# Patient Record
Sex: FEMALE | Race: WHITE | Employment: FULL TIME | URBAN - METROPOLITAN AREA
[De-identification: names, ages, dates, MRNs, and addresses within clinical notes are randomized per-mention and may not be internally consistent; named-entity substitution may affect disease eponyms.]

---

## 2021-01-21 ENCOUNTER — IMMUNIZATION (OUTPATIENT)
Dept: PEDIATRICS | Facility: CLINIC | Age: 61
End: 2021-01-21
Payer: COMMERCIAL

## 2021-01-21 PROCEDURE — 0001A PR COVID VAC PFIZER DIL RECON 30 MCG/0.3 ML IM: CPT

## 2021-01-21 PROCEDURE — 91300 PR COVID VAC PFIZER DIL RECON 30 MCG/0.3 ML IM: CPT

## 2021-01-24 ENCOUNTER — HEALTH MAINTENANCE LETTER (OUTPATIENT)
Age: 61
End: 2021-01-24

## 2021-02-11 ENCOUNTER — IMMUNIZATION (OUTPATIENT)
Dept: PEDIATRICS | Facility: CLINIC | Age: 61
End: 2021-02-11
Attending: INTERNAL MEDICINE
Payer: COMMERCIAL

## 2021-02-11 PROCEDURE — 0002A PR COVID VAC PFIZER DIL RECON 30 MCG/0.3 ML IM: CPT

## 2021-02-11 PROCEDURE — 91300 PR COVID VAC PFIZER DIL RECON 30 MCG/0.3 ML IM: CPT

## 2021-07-30 DIAGNOSIS — Z11.59 ENCOUNTER FOR SCREENING FOR OTHER VIRAL DISEASES: ICD-10-CM

## 2021-08-15 ENCOUNTER — LAB (OUTPATIENT)
Dept: URGENT CARE | Facility: URGENT CARE | Age: 61
End: 2021-08-15
Attending: OBSTETRICS & GYNECOLOGY
Payer: COMMERCIAL

## 2021-08-15 DIAGNOSIS — Z11.59 ENCOUNTER FOR SCREENING FOR OTHER VIRAL DISEASES: ICD-10-CM

## 2021-08-15 PROCEDURE — U0005 INFEC AGEN DETEC AMPLI PROBE: HCPCS

## 2021-08-15 PROCEDURE — U0003 INFECTIOUS AGENT DETECTION BY NUCLEIC ACID (DNA OR RNA); SEVERE ACUTE RESPIRATORY SYNDROME CORONAVIRUS 2 (SARS-COV-2) (CORONAVIRUS DISEASE [COVID-19]), AMPLIFIED PROBE TECHNIQUE, MAKING USE OF HIGH THROUGHPUT TECHNOLOGIES AS DESCRIBED BY CMS-2020-01-R: HCPCS

## 2021-08-16 LAB — SARS-COV-2 RNA RESP QL NAA+PROBE: NEGATIVE

## 2021-08-17 RX ORDER — CETIRIZINE HYDROCHLORIDE 10 MG/1
10 TABLET ORAL DAILY
COMMUNITY

## 2021-08-17 RX ORDER — HYDROXYZINE HYDROCHLORIDE 25 MG/1
25 TABLET, FILM COATED ORAL EVERY 6 HOURS PRN
COMMUNITY
End: 2024-02-23

## 2021-08-17 RX ORDER — PETROLATUM,WHITE/LANOLIN
OINTMENT (GRAM) TOPICAL DAILY
COMMUNITY

## 2021-08-17 RX ORDER — ATORVASTATIN CALCIUM 40 MG/1
40 TABLET, FILM COATED ORAL DAILY
COMMUNITY

## 2021-08-17 RX ORDER — MONTELUKAST SODIUM 10 MG/1
10 TABLET ORAL AT BEDTIME
COMMUNITY

## 2021-08-17 RX ORDER — FAMOTIDINE 20 MG/1
20 TABLET, FILM COATED ORAL 2 TIMES DAILY
COMMUNITY

## 2021-08-17 RX ORDER — VITAMIN B COMPLEX
TABLET ORAL DAILY
COMMUNITY

## 2021-08-18 ENCOUNTER — ANESTHESIA (OUTPATIENT)
Dept: SURGERY | Facility: CLINIC | Age: 61
End: 2021-08-18
Payer: COMMERCIAL

## 2021-08-18 ENCOUNTER — ANESTHESIA EVENT (OUTPATIENT)
Dept: SURGERY | Facility: CLINIC | Age: 61
End: 2021-08-18
Payer: COMMERCIAL

## 2021-08-18 ENCOUNTER — HOSPITAL ENCOUNTER (OUTPATIENT)
Facility: CLINIC | Age: 61
Discharge: HOME | End: 2021-08-18
Attending: OBSTETRICS & GYNECOLOGY | Admitting: OBSTETRICS & GYNECOLOGY
Payer: COMMERCIAL

## 2021-08-18 VITALS
DIASTOLIC BLOOD PRESSURE: 76 MMHG | TEMPERATURE: 97.8 F | BODY MASS INDEX: 29.53 KG/M2 | WEIGHT: 173 LBS | SYSTOLIC BLOOD PRESSURE: 123 MMHG | RESPIRATION RATE: 16 BRPM | HEIGHT: 64 IN | OXYGEN SATURATION: 99 % | HEART RATE: 61 BPM

## 2021-08-18 DIAGNOSIS — Z90.722 S/P BSO (STATUS POST BILATERAL SALPINGO-OOPHORECTOMY): Primary | ICD-10-CM

## 2021-08-18 LAB — GLUCOSE BLDC GLUCOMTR-MCNC: 107 MG/DL (ref 70–99)

## 2021-08-18 PROCEDURE — 250N000009 HC RX 250: Performed by: OBSTETRICS & GYNECOLOGY

## 2021-08-18 PROCEDURE — 88305 TISSUE EXAM BY PATHOLOGIST: CPT | Mod: TC | Performed by: OBSTETRICS & GYNECOLOGY

## 2021-08-18 PROCEDURE — 250N000009 HC RX 250: Performed by: SURGERY

## 2021-08-18 PROCEDURE — 370N000017 HC ANESTHESIA TECHNICAL FEE, PER MIN: Performed by: OBSTETRICS & GYNECOLOGY

## 2021-08-18 PROCEDURE — 250N000011 HC RX IP 250 OP 636: Performed by: SURGERY

## 2021-08-18 PROCEDURE — 250N000013 HC RX MED GY IP 250 OP 250 PS 637: Performed by: OBSTETRICS & GYNECOLOGY

## 2021-08-18 PROCEDURE — 999N000141 HC STATISTIC PRE-PROCEDURE NURSING ASSESSMENT: Performed by: OBSTETRICS & GYNECOLOGY

## 2021-08-18 PROCEDURE — 258N000003 HC RX IP 258 OP 636: Performed by: SURGERY

## 2021-08-18 PROCEDURE — 258N000001 HC RX 258: Performed by: OBSTETRICS & GYNECOLOGY

## 2021-08-18 PROCEDURE — 272N000001 HC OR GENERAL SUPPLY STERILE: Performed by: OBSTETRICS & GYNECOLOGY

## 2021-08-18 PROCEDURE — 250N000025 HC SEVOFLURANE, PER MIN: Performed by: OBSTETRICS & GYNECOLOGY

## 2021-08-18 PROCEDURE — 710N000012 HC RECOVERY PHASE 2, PER MINUTE: Performed by: OBSTETRICS & GYNECOLOGY

## 2021-08-18 PROCEDURE — 710N000009 HC RECOVERY PHASE 1, LEVEL 1, PER MIN: Performed by: OBSTETRICS & GYNECOLOGY

## 2021-08-18 PROCEDURE — 360N000076 HC SURGERY LEVEL 3, PER MIN: Performed by: OBSTETRICS & GYNECOLOGY

## 2021-08-18 PROCEDURE — 250N000011 HC RX IP 250 OP 636: Performed by: OBSTETRICS & GYNECOLOGY

## 2021-08-18 RX ORDER — VASOPRESSIN 20 U/ML
INJECTION PARENTERAL
Status: DISCONTINUED
Start: 2021-08-18 | End: 2021-08-18 | Stop reason: HOSPADM

## 2021-08-18 RX ORDER — FENTANYL CITRATE 0.05 MG/ML
50 INJECTION, SOLUTION INTRAMUSCULAR; INTRAVENOUS
Status: DISCONTINUED | OUTPATIENT
Start: 2021-08-18 | End: 2021-08-18 | Stop reason: HOSPADM

## 2021-08-18 RX ORDER — SODIUM CHLORIDE, SODIUM LACTATE, POTASSIUM CHLORIDE, CALCIUM CHLORIDE 600; 310; 30; 20 MG/100ML; MG/100ML; MG/100ML; MG/100ML
INJECTION, SOLUTION INTRAVENOUS CONTINUOUS PRN
Status: DISCONTINUED | OUTPATIENT
Start: 2021-08-18 | End: 2021-08-18

## 2021-08-18 RX ORDER — ONDANSETRON 2 MG/ML
INJECTION INTRAMUSCULAR; INTRAVENOUS PRN
Status: DISCONTINUED | OUTPATIENT
Start: 2021-08-18 | End: 2021-08-18

## 2021-08-18 RX ORDER — KETOROLAC TROMETHAMINE 15 MG/ML
15 INJECTION, SOLUTION INTRAMUSCULAR; INTRAVENOUS EVERY 6 HOURS PRN
Status: DISCONTINUED | OUTPATIENT
Start: 2021-08-18 | End: 2021-08-18 | Stop reason: HOSPADM

## 2021-08-18 RX ORDER — HYDRALAZINE HYDROCHLORIDE 20 MG/ML
2.5-5 INJECTION INTRAMUSCULAR; INTRAVENOUS EVERY 10 MIN PRN
Status: DISCONTINUED | OUTPATIENT
Start: 2021-08-18 | End: 2021-08-18 | Stop reason: HOSPADM

## 2021-08-18 RX ORDER — BUPIVACAINE HYDROCHLORIDE 2.5 MG/ML
INJECTION, SOLUTION EPIDURAL; INFILTRATION; INTRACAUDAL
Status: DISCONTINUED
Start: 2021-08-18 | End: 2021-08-18 | Stop reason: HOSPADM

## 2021-08-18 RX ORDER — BUPIVACAINE HYDROCHLORIDE 2.5 MG/ML
INJECTION, SOLUTION INFILTRATION; PERINEURAL PRN
Status: DISCONTINUED | OUTPATIENT
Start: 2021-08-18 | End: 2021-08-18 | Stop reason: HOSPADM

## 2021-08-18 RX ORDER — NEOSTIGMINE METHYLSULFATE 1 MG/ML
VIAL (ML) INJECTION PRN
Status: DISCONTINUED | OUTPATIENT
Start: 2021-08-18 | End: 2021-08-18

## 2021-08-18 RX ORDER — ACETAMINOPHEN 325 MG/1
975 TABLET ORAL ONCE
Status: DISCONTINUED | OUTPATIENT
Start: 2021-08-18 | End: 2021-08-18 | Stop reason: HOSPADM

## 2021-08-18 RX ORDER — OXYCODONE HYDROCHLORIDE 5 MG/1
5 TABLET ORAL
Status: COMPLETED | OUTPATIENT
Start: 2021-08-18 | End: 2021-08-18

## 2021-08-18 RX ORDER — IBUPROFEN 200 MG
600 TABLET ORAL EVERY 6 HOURS PRN
COMMUNITY
Start: 2021-08-18

## 2021-08-18 RX ORDER — LIDOCAINE HYDROCHLORIDE 20 MG/ML
INJECTION, SOLUTION INFILTRATION; PERINEURAL PRN
Status: DISCONTINUED | OUTPATIENT
Start: 2021-08-18 | End: 2021-08-18

## 2021-08-18 RX ORDER — PROPOFOL 10 MG/ML
INJECTION, EMULSION INTRAVENOUS CONTINUOUS PRN
Status: DISCONTINUED | OUTPATIENT
Start: 2021-08-18 | End: 2021-08-18

## 2021-08-18 RX ORDER — PROPOFOL 10 MG/ML
INJECTION, EMULSION INTRAVENOUS PRN
Status: DISCONTINUED | OUTPATIENT
Start: 2021-08-18 | End: 2021-08-18

## 2021-08-18 RX ORDER — GLYCOPYRROLATE 0.2 MG/ML
INJECTION, SOLUTION INTRAMUSCULAR; INTRAVENOUS PRN
Status: DISCONTINUED | OUTPATIENT
Start: 2021-08-18 | End: 2021-08-18

## 2021-08-18 RX ORDER — KETOROLAC TROMETHAMINE 30 MG/ML
30 INJECTION, SOLUTION INTRAMUSCULAR; INTRAVENOUS ONCE
Status: DISCONTINUED | OUTPATIENT
Start: 2021-08-18 | End: 2021-08-18 | Stop reason: HOSPADM

## 2021-08-18 RX ORDER — OXYCODONE HYDROCHLORIDE 5 MG/1
5-10 TABLET ORAL EVERY 4 HOURS PRN
Qty: 12 TABLET | Refills: 0 | Status: SHIPPED | OUTPATIENT
Start: 2021-08-18 | End: 2021-08-21

## 2021-08-18 RX ORDER — DEXAMETHASONE SODIUM PHOSPHATE 4 MG/ML
INJECTION, SOLUTION INTRA-ARTICULAR; INTRALESIONAL; INTRAMUSCULAR; INTRAVENOUS; SOFT TISSUE PRN
Status: DISCONTINUED | OUTPATIENT
Start: 2021-08-18 | End: 2021-08-18

## 2021-08-18 RX ORDER — FENTANYL CITRATE 0.05 MG/ML
25 INJECTION, SOLUTION INTRAMUSCULAR; INTRAVENOUS EVERY 5 MIN PRN
Status: DISCONTINUED | OUTPATIENT
Start: 2021-08-18 | End: 2021-08-18 | Stop reason: HOSPADM

## 2021-08-18 RX ORDER — KETOROLAC TROMETHAMINE 30 MG/ML
INJECTION, SOLUTION INTRAMUSCULAR; INTRAVENOUS PRN
Status: DISCONTINUED | OUTPATIENT
Start: 2021-08-18 | End: 2021-08-18

## 2021-08-18 RX ORDER — ONDANSETRON 4 MG/1
4 TABLET, ORALLY DISINTEGRATING ORAL EVERY 30 MIN PRN
Status: DISCONTINUED | OUTPATIENT
Start: 2021-08-18 | End: 2021-08-18 | Stop reason: HOSPADM

## 2021-08-18 RX ORDER — ACETAMINOPHEN 325 MG/1
975 TABLET ORAL ONCE
Status: COMPLETED | OUTPATIENT
Start: 2021-08-18 | End: 2021-08-18

## 2021-08-18 RX ORDER — IBUPROFEN 400 MG/1
800 TABLET, FILM COATED ORAL ONCE
Status: DISCONTINUED | OUTPATIENT
Start: 2021-08-18 | End: 2021-08-18 | Stop reason: HOSPADM

## 2021-08-18 RX ORDER — SODIUM CHLORIDE, SODIUM LACTATE, POTASSIUM CHLORIDE, CALCIUM CHLORIDE 600; 310; 30; 20 MG/100ML; MG/100ML; MG/100ML; MG/100ML
INJECTION, SOLUTION INTRAVENOUS CONTINUOUS
Status: DISCONTINUED | OUTPATIENT
Start: 2021-08-18 | End: 2021-08-18 | Stop reason: HOSPADM

## 2021-08-18 RX ORDER — MEPERIDINE HYDROCHLORIDE 25 MG/ML
12.5 INJECTION INTRAMUSCULAR; INTRAVENOUS; SUBCUTANEOUS
Status: DISCONTINUED | OUTPATIENT
Start: 2021-08-18 | End: 2021-08-18 | Stop reason: HOSPADM

## 2021-08-18 RX ORDER — HYDROMORPHONE HCL IN WATER/PF 6 MG/30 ML
0.2 PATIENT CONTROLLED ANALGESIA SYRINGE INTRAVENOUS EVERY 5 MIN PRN
Status: DISCONTINUED | OUTPATIENT
Start: 2021-08-18 | End: 2021-08-18 | Stop reason: HOSPADM

## 2021-08-18 RX ORDER — ONDANSETRON 2 MG/ML
4 INJECTION INTRAMUSCULAR; INTRAVENOUS EVERY 30 MIN PRN
Status: DISCONTINUED | OUTPATIENT
Start: 2021-08-18 | End: 2021-08-18 | Stop reason: HOSPADM

## 2021-08-18 RX ORDER — FENTANYL CITRATE 50 UG/ML
INJECTION, SOLUTION INTRAMUSCULAR; INTRAVENOUS PRN
Status: DISCONTINUED | OUTPATIENT
Start: 2021-08-18 | End: 2021-08-18

## 2021-08-18 RX ORDER — ACETAMINOPHEN 325 MG/1
975 TABLET ORAL EVERY 6 HOURS PRN
Qty: 50 TABLET | Refills: 0 | COMMUNITY
Start: 2021-08-18

## 2021-08-18 RX ORDER — OXYCODONE HYDROCHLORIDE 5 MG/1
5 TABLET ORAL EVERY 4 HOURS PRN
Status: DISCONTINUED | OUTPATIENT
Start: 2021-08-18 | End: 2021-08-18 | Stop reason: HOSPADM

## 2021-08-18 RX ORDER — MAGNESIUM HYDROXIDE 1200 MG/15ML
LIQUID ORAL PRN
Status: DISCONTINUED | OUTPATIENT
Start: 2021-08-18 | End: 2021-08-18 | Stop reason: HOSPADM

## 2021-08-18 RX ORDER — SODIUM CHLORIDE, SODIUM LACTATE, POTASSIUM CHLORIDE, CALCIUM CHLORIDE 600; 310; 30; 20 MG/100ML; MG/100ML; MG/100ML; MG/100ML
INJECTION, SOLUTION INTRAVENOUS ONCE
Status: COMPLETED | OUTPATIENT
Start: 2021-08-18 | End: 2021-08-18

## 2021-08-18 RX ADMIN — FENTANYL CITRATE 25 MCG: 50 INJECTION, SOLUTION INTRAMUSCULAR; INTRAVENOUS at 13:23

## 2021-08-18 RX ADMIN — OXYCODONE HYDROCHLORIDE 5 MG: 5 TABLET ORAL at 13:55

## 2021-08-18 RX ADMIN — PROPOFOL 200 MG: 10 INJECTION, EMULSION INTRAVENOUS at 11:16

## 2021-08-18 RX ADMIN — DEXAMETHASONE SODIUM PHOSPHATE 4 MG: 4 INJECTION, SOLUTION INTRA-ARTICULAR; INTRALESIONAL; INTRAMUSCULAR; INTRAVENOUS; SOFT TISSUE at 11:28

## 2021-08-18 RX ADMIN — SODIUM CHLORIDE, POTASSIUM CHLORIDE, SODIUM LACTATE AND CALCIUM CHLORIDE: 600; 310; 30; 20 INJECTION, SOLUTION INTRAVENOUS at 11:12

## 2021-08-18 RX ADMIN — FENTANYL CITRATE 25 MCG: 50 INJECTION, SOLUTION INTRAMUSCULAR; INTRAVENOUS at 13:28

## 2021-08-18 RX ADMIN — LIDOCAINE HYDROCHLORIDE 80 MG: 20 INJECTION, SOLUTION INFILTRATION; PERINEURAL at 11:16

## 2021-08-18 RX ADMIN — SODIUM CHLORIDE, POTASSIUM CHLORIDE, SODIUM LACTATE AND CALCIUM CHLORIDE: 600; 310; 30; 20 INJECTION, SOLUTION INTRAVENOUS at 14:19

## 2021-08-18 RX ADMIN — FENTANYL CITRATE 100 MCG: 50 INJECTION, SOLUTION INTRAMUSCULAR; INTRAVENOUS at 11:16

## 2021-08-18 RX ADMIN — ONDANSETRON 4 MG: 2 INJECTION INTRAMUSCULAR; INTRAVENOUS at 12:24

## 2021-08-18 RX ADMIN — ROCURONIUM BROMIDE 50 MG: 10 INJECTION INTRAVENOUS at 11:16

## 2021-08-18 RX ADMIN — HYDROMORPHONE HYDROCHLORIDE 0.2 MG: 0.2 INJECTION, SOLUTION INTRAMUSCULAR; INTRAVENOUS; SUBCUTANEOUS at 13:36

## 2021-08-18 RX ADMIN — MIDAZOLAM 2 MG: 1 INJECTION INTRAMUSCULAR; INTRAVENOUS at 11:12

## 2021-08-18 RX ADMIN — NEOSTIGMINE METHYLSULFATE 4 MG: 1 INJECTION, SOLUTION INTRAVENOUS at 12:32

## 2021-08-18 RX ADMIN — PROPOFOL 30 MCG/KG/MIN: 10 INJECTION, EMULSION INTRAVENOUS at 11:19

## 2021-08-18 RX ADMIN — HYDROMORPHONE HYDROCHLORIDE 0.5 MG: 1 INJECTION, SOLUTION INTRAMUSCULAR; INTRAVENOUS; SUBCUTANEOUS at 11:41

## 2021-08-18 RX ADMIN — DEXMEDETOMIDINE 12 MCG: 100 INJECTION, SOLUTION, CONCENTRATE INTRAVENOUS at 11:55

## 2021-08-18 RX ADMIN — ACETAMINOPHEN 975 MG: 325 TABLET, FILM COATED ORAL at 09:19

## 2021-08-18 RX ADMIN — KETOROLAC TROMETHAMINE 30 MG: 30 INJECTION, SOLUTION INTRAMUSCULAR at 12:27

## 2021-08-18 RX ADMIN — GLYCOPYRROLATE 0.6 MG: 0.2 INJECTION, SOLUTION INTRAMUSCULAR; INTRAVENOUS at 12:32

## 2021-08-18 ASSESSMENT — MIFFLIN-ST. JEOR: SCORE: 1336.78

## 2021-08-18 ASSESSMENT — ENCOUNTER SYMPTOMS: DYSRHYTHMIAS: 0

## 2021-08-18 NOTE — OP NOTE
[facility]  Gynecology Brief Operative Note    Pre-operative diagnosis: Cysts of both ovaries [N83.201, N83.202]   Post-operative diagnosis Right ovarian cyst   Procedure: Procedure(s):  LAPAROSCOPIC BILATERAL SALPINGO-OOPHORECTOMY   Surgeon: Jennifer L. Schwab, MD   Assistants(s): Esme Chen MD   Anesthesia: General    Estimated blood loss: 25cc    Total IV fluids: (See anesthesia record)   Blood transfusion: No transfusion was given during surgery   Total urine output: (See anesthesia record)  100ml   Drains: None   Specimens: Right fallopian tube and ovary, left fallopian tube and ovary   Implants: None   Findings: 6cm right ovarian cyst, otherwise normal appearing anatomy   Complications: None   Condition: Stable   Comments: See dictated operative report for full details    Jennifer L. Schwab, MD

## 2021-08-18 NOTE — ANESTHESIA PROCEDURE NOTES
Airway       Patient location during procedure: OR       Procedure Start/Stop Times: 8/18/2021 11:18 AM  Staff -        Anesthesiologist:  Josse Chris MD       CRNA: Holli Frausto APRN CRNA       Other Anesthesia Staff: Steve Patel       Performed By: SCOTT  Consent for Airway        Urgency: elective  Indications and Patient Condition       Indications for airway management: eunice-procedural       Induction type:intravenous       Mask difficulty assessment: 2 - vent by mask + OA or adjuvant +/- NMBA    Final Airway Details       Final airway type: endotracheal airway       Successful airway: ETT - single and Oral  Endotracheal Airway Details        ETT size (mm): 7.0       Cuffed: yes       Successful intubation technique: direct laryngoscopy       DL Blade Type: MAC 3       Grade View of Cords: 1       Adjucts: stylet       Position: Right       Secured at (cm): 22       Bite block used: None    Post intubation assessment        Placement verified by: capnometry, equal breath sounds and chest rise        Number of attempts at approach: 1       Secured with: pink tape       Ease of procedure: easy       Dentition: Intact and Unchanged

## 2021-08-18 NOTE — ANESTHESIA CARE TRANSFER NOTE
Patient: Nadja Matias    Procedure(s):  LAPAROSCOPIC BILATERAL SALPINGO-OOPHORECTOMY    Diagnosis: Cysts of both ovaries [N83.201, N83.202]  Diagnosis Additional Information: No value filed.    Anesthesia Type:   General     Note:      Level of Consciousness: awake  Oxygen Supplementation: face mask    Independent Airway: airway patency satisfactory and stable        Patient transferred to: PACU  Comments: Neuromuscular blockade reversed after TOF 4/4, spontaneous respirations, adequate tidal volumes, followed commands to voice, oropharynx suctioned with soft flexible catheter, extubated atraumatically, extubated with suction, airway patent after extubation.  Oxygen via facemask at 8 liters per minute to PACU. Oxygen tubing connected to wall O2 in PACU, SpO2, NiBP, and EKG monitors and alarms on and functioning, Sandy Hugger warmer connected to patient gown, report on patient's clinical status given to PACU RN, RN questions answered.     Handoff Report: Identifed the Patient, Identified the Reponsible Provider, Reviewed the pertinent medical history, Discussed the surgical course, Reviewed Intra-OP anesthesia mangement and issues during anesthesia, Set expectations for post-procedure period and Allowed opportunity for questions and acknowledgement of understanding      Electronically Signed By: RUBI Lombardi CRNA  August 18, 2021  12:46 PM

## 2021-08-18 NOTE — ANESTHESIA PREPROCEDURE EVALUATION
Anesthesia Pre-Procedure Evaluation    Patient: Nadja Matias   MRN: [id number] : 1960        Preoperative Diagnosis: Cysts of both ovaries [N83.201, N83.202]   Procedure : Procedure(s):  LAPAROSCOPIC BILATERAL SALPINGO-OOPHORECTOMY     Past Medical History:   Diagnosis Date     Diabetes (H)      Gastroesophageal reflux disease      Hypertension      Migraine      Uncomplicated asthma       Past Surgical History:   Procedure Laterality Date      SECTION       COLONOSCOPY       DILATION AND CURETTAGE       ORTHOPEDIC SURGERY Right 2012    hamstring repair     WRIST SURGERY        Allergies   Allergen Reactions     Amoxicillin      Latex      Nickel      Sulfa Drugs       Social History     Tobacco Use     Smoking status: Never Smoker     Smokeless tobacco: Never Used   Substance Use Topics     Alcohol use: Yes     Comment: social      Wt Readings from Last 1 Encounters:   21 78.5 kg (173 lb)        Anesthesia Evaluation            ROS/MED HX  ENT/Pulmonary:     (+) sleep apnea, uses CPAP, allergic rhinitis, asthma     Neurologic:     (+) migraines,     Cardiovascular:     (+) hypertension----- (-) CHF and arrhythmias   METS/Exercise Tolerance:     Hematologic:       Musculoskeletal:       GI/Hepatic:     (+) GERD,     Renal/Genitourinary:       Endo:     (+) type II DM, Not using insulin,     Psychiatric/Substance Use:       Infectious Disease:       Malignancy:       Other:            Physical Exam    Airway        Mallampati: I   TM distance: > 3 FB   Neck ROM: full   Mouth opening: > 3 cm    Respiratory Devices and Support         Dental  no notable dental history         Cardiovascular   cardiovascular exam normal          Pulmonary   pulmonary exam normal                OUTSIDE LABS:  CBC:   Lab Results   Component Value Date    WBC 9.5 2006    HGB 14.6 2006    HCT 43.1 2006     2006     BMP:   Lab Results   Component Value Date     (H) 2021      COAGS: No results found for: PTT, INR, FIBR  POC: No results found for: BGM, HCG, HCGS  HEPATIC: No results found for: ALBUMIN, PROTTOTAL, ALT, AST, GGT, ALKPHOS, BILITOTAL, BILIDIRECT, EMILEE  OTHER:   Lab Results   Component Value Date    TSH 1.11 06/16/2006       Anesthesia Plan    ASA Status:  2   NPO Status:  NPO Appropriate    Anesthesia Type: General.     - Airway: ETT   Induction: Intravenous, Propofol.   Maintenance: Balanced.        Consents    Anesthesia Plan(s) and associated risks, benefits, and realistic alternatives discussed. Questions answered and patient/representative(s) expressed understanding.     - Discussed with:  Patient         Postoperative Care    Pain management: IV analgesics, Multi-modal analgesia.   PONV prophylaxis: Ondansetron (or other 5HT-3), Dexamethasone or Solumedrol, Background Propofol Infusion     Comments:                Josse Chris MD

## 2021-08-18 NOTE — ANESTHESIA POSTPROCEDURE EVALUATION
Patient: Nadja Matias    Procedure(s):  LAPAROSCOPIC BILATERAL SALPINGO-OOPHORECTOMY    Diagnosis:Cysts of both ovaries [N83.201, N83.202]  Diagnosis Additional Information: No value filed.    Anesthesia Type:  General    Note:  Disposition: Outpatient   Postop Pain Control: Uneventful            Sign Out: Well controlled pain   PONV: No   Neuro/Psych: Uneventful            Sign Out: Acceptable/Baseline neuro status   Airway/Respiratory: Uneventful            Sign Out: Acceptable/Baseline resp. status   CV/Hemodynamics: Uneventful            Sign Out: Acceptable CV status   Other NRE: NONE   DID A NON-ROUTINE EVENT OCCUR? No           Last vitals:  Vitals Value Taken Time   /78 08/18/21 1400   Temp 36.6  C (97.8  F) 08/18/21 1400   Pulse 64 08/18/21 1408   Resp 14 08/18/21 1408   SpO2 100 % 08/18/21 1408   Vitals shown include unvalidated device data.    Electronically Signed By: Josse Chris MD  August 18, 2021  3:06 PM

## 2021-08-18 NOTE — OP NOTE
Procedure Date: 08/18/2021    PREOPERATIVE DIAGNOSIS:  Right ovarian cyst.    POSTOPERATIVE DIAGNOSIS:  Right ovarian cyst.    PROCEDURE PERFORMED:  Laparoscopic bilateral salpingo-oophorectomy.    SURGEON:  Jennifer Schwab, MD    ASSISTANT:  Esme Chen MD    ANESTHESIA:  General.    ESTIMATED BLOOD LOSS:  25 mL.    TOTAL URINE OUTPUT:  100 mL.    SPECIMENS:  Right fallopian tube and ovary and left fallopian tube and ovary.    FINDINGS:  A 6 cm right ovarian cyst, otherwise normal appearing anatomy.    COMPLICATIONS:  None.    DESCRIPTION OF PROCEDURE:  The patient was counseled and consented and taken to the operating room, where general anesthesia was administered.  She was placed in the dorsal lithotomy position using Yellofin stirrups and prepped and draped in the usual sterile fashion.  After a time-out was performed, bivalve speculum was inserted and a single tooth tenaculum used to grasp the anterior lip of the cervix.  An acorn uterine manipulator was placed and the speculum was removed.  A Dash catheter was inserted.  Attention was turned to the patient's abdomen.  An incision was made above the umbilicus with a #11 scalpel and Veress needle was inserted.  Proper placement was confirmed and pneumoperitoneum was obtained.  A 5 mm trocar was placed and there was good visualization with the laparoscope.  A 5 mm trocar was placed in the right lower quadrant under direct visualization and an 11 mm trocar was placed in the left lower quadrant.     Attention was first turned to the right adnexa with a 6 cm right ovarian cyst. It was smooth and appeared fluid-filled.  First, the right IP ligament was ligated with use of the LigaSure.  The right uteroovarian and fallopian tube were then ligated and once these dissections were connected in the middle, the right ovary with cyst and fallopian tube were completely ligated with no rupture of the ovarian cyst.  A left salpingo-oophorectomy was then carried out in a  similar fashion.  The left and right adnexa were removed from the abdomen separately.  An EndoCatch bag was placed through the 11 mm port.  First, the left ovary and fallopian tube were removed and sent for pathology, followed by the right fallopian tube with ovary.  The right ovarian cyst was ruptured within the EndoCatch bag and there was some spillage onto the surgical field externally, but there was no spillage within the peritoneal cavity; this was brown serous fluid.  The right ovary with cyst was then removed in pieces until the EndoCatch bag was removed.  Irrigation was performed and hemostasis was observed.  At this point, the procedure was complete.  The left lower quadrant 11 mm site fascia was closed with a Carroll-Eileen and 0 Vicryl.  Pneumoperitoneum was released, trocars were removed and skin was closed with 4-0 Monocryl in a subcuticular fashion.  Incisions were covered with Steri-Strips and Band-Aids.  Dash catheter was removed.  Uterine manipulator was removed and on visualization of the cervix, hemostasis was observed.  All counts were correct.  The patient was extubated.  She tolerated the procedure well and was transferred to recovery in stable condition.      Jennifer Schwab, MD        D: 2021   T: 2021   MT: [id number]    Name:     JUSTINA SHIRLEY  MRN:      [id number]        Account:        [id number]   :      1960           Procedure Date: 2021     Document: [id number]

## 2021-08-18 NOTE — DISCHARGE INSTRUCTIONS
Today you received Toradol, an antiinflammatory medication similar to Ibuprofen.  You should not take other antiinflammatory medication, such as Ibuprofen, Motrin, Advil, Aleve, Naprosyn, etc until 6:30 pm today.     Today you were given 975 mg of Tylenol at 0920am. The recommended daily maximum dose is 4000 mg.     ** Because you had anesthesia today and your history of sleep apnea, it is extremely important that you use your CPAP machine for the next 24 hours while napping or sleeping.**          Same Day Surgery Discharge Instructions for  Sedation and General Anesthesia       It's not unusual to feel dizzy, light-headed or faint for up to 24 hours after surgery or while taking pain medication.  If you have these symptoms: sit for a few minutes before standing and have someone assist you when you get up to walk or use the bathroom.      You should rest and relax for the next 24 hours. We recommend you make arrangements to have an adult stay with you for at least 24 hours after your discharge.  Avoid hazardous and strenuous activity.      DO NOT DRIVE any vehicle or operate mechanical equipment for 24 hours following the end of your surgery.  Even though you may feel normal, your reactions may be affected by the medication you have received.      Do not drink alcoholic beverages for 24 hours following surgery.       Slowly progress to your regular diet as you feel able. It's not unusual to feel nauseated and/or vomit after receiving anesthesia.  If you develop these symptoms, drink clear liquids (apple juice, ginger ale, broth, 7-up, etc. ) until you feel better.  If your nausea and vomiting persists for 24 hours, please notify your surgeon.        All narcotic pain medications, along with inactivity and anesthesia, can cause constipation. Drinking plenty of liquids and increasing fiber intake will help.      For any questions of a medical nature, call your surgeon.      Do not make important decisions for 24  hours.      If you had general anesthesia, you may have a sore throat for a couple of days related to the breathing tube used during surgery.  You may use Cepacol lozenges to help with this discomfort.  If it worsens or if you develop a fever, contact your surgeon.       If you feel your pain is not well managed with the pain medications prescribed by your surgeon, please contact your surgeon's office to let them know so they can address your concerns.       CoVid 19 Information    We want to give you information regarding Covid. Please consult your primary care provider with any questions you might have.     Patient who have symptoms (cough, fever, or shortness of breath), need to isolate for 7 days from when symptoms started OR 72 hours after fever resolves (without fever reducing medications) AND improvement of respiratory symptoms (whichever is longer).      Isolate yourself at home (in own room/own bathroom if possible)    Do Not allow any visitors    Do Not go to work or school    Do Not go to Muslim,  centers, shopping, or other public places.    Do Not shake hands.    Avoid close and intimate contact with others (hugging, kissing).    Follow CDC recommendations for household cleaning of frequently touched services.     After the initial 7 days, continue to isolate yourself from household members as much as possible. To continue decrease the risk of community spread and exposure, you and any members of your household should limit activities in public for 14 days after starting home isolation.     You can reference the following CDC link for helpful home isolation/care tips:  https://www.cdc.gov/coronavirus/2019-ncov/downloads/10Things.pdf    Protect Others:    Cover Your Mouth and Nose with a mask, disposable tissue or wash cloth to avoid spreading germs to others.    Wash your hands and face frequently with soap and water    Call Your Primary Doctor If: Breathing difficulty develops or you become  worse.    For more information about COVID19 and options for caring for yourself at home, please visit the CDC website at https://www.cdc.gov/coronavirus/2019-ncov/about/steps-when-sick.html  For more options for care at [facility], please visit our website at [email or web address]    HOME CARE FOLLOWING LAPAROSCOPY    Diet  You have no restrictions on your diet.  During the evening following surgery, drink plenty of fluids and eat a light supper.    Nausea  The anesthesia may produce some nausea.  If you feel nauseated, stay in bed and try drinking fluids such as 7-Up, tea, or soup.    Discomfort  The amount of discomfort you can expect is very unpredictable.  If you have pain that cannot be controlled with Tylenol or with the prescription you may have received, you should notify your physician.  The following complaints are not uncommon and should not be cause for concern:   1.  Abdominal tenderness; abdominal cramping   2.  Low backache or pain radiating to your shoulders, chest or back. This is a result of the gas used to inflate your abdomen during surgery. Lying flat in bed seems to help relieve this.   3.  Sore throat for a day or two resulting from the anesthesia tube used during surgery.   4.  Black or blue anel on your abdomen.     Drainage  You may expect a small amount of drainage from the incision on your abdomen and you may change the bandage when necessary.  You will also have a small amount of vaginal drainage for several days; this is normal and no cause for concern.  If excessive bleeding occurs, notify your physician.      If dye was used during your procedure, your urine will initially be bright blue. It will gradually return to yellow throughout the day. Drinking plenty of fluids will help to filter the dye from your urine.    Fever  A low grade fever (not over 100 F) is usual after this procedure.  Do not hesitate to notify your physician if your fever seems  excessive.    Stitches  If your stitches are the type that must be removed, your doctor will instruct you to return to their office.    Activity  Rest on the day of surgery then you may resume your normal activity, as tolerated. Avoid heavy lifting for one week.    You may shower.  Do not douche, and do not use tampons.  If you also had a D&C, do not resume intercourse until bleeding has ceased.            Emergency Care  Contact your physician if you have any of these problems:   1.  A fever over 100 F   2.  A large amount of bleeding or drainage   3.  Severe pain      **If you have questions or concerns about your procedure,   call Dr. Schwab  **

## 2021-08-20 LAB
PATH REPORT.COMMENTS IMP SPEC: NORMAL
PATH REPORT.COMMENTS IMP SPEC: NORMAL
PATH REPORT.FINAL DX SPEC: NORMAL
PATH REPORT.GROSS SPEC: NORMAL
PATH REPORT.MICROSCOPIC SPEC OTHER STN: NORMAL
PATH REPORT.RELEVANT HX SPEC: NORMAL
PHOTO IMAGE: NORMAL

## 2021-08-20 PROCEDURE — 88307 TISSUE EXAM BY PATHOLOGIST: CPT | Mod: 26 | Performed by: PATHOLOGY

## 2021-08-20 PROCEDURE — 88305 TISSUE EXAM BY PATHOLOGIST: CPT | Mod: 26 | Performed by: PATHOLOGY

## 2021-09-05 ENCOUNTER — HEALTH MAINTENANCE LETTER (OUTPATIENT)
Age: 61
End: 2021-09-05

## 2022-02-19 ENCOUNTER — HEALTH MAINTENANCE LETTER (OUTPATIENT)
Age: 62
End: 2022-02-19

## 2022-06-11 ENCOUNTER — TRANSFERRED RECORDS (OUTPATIENT)
Dept: HEALTH INFORMATION MANAGEMENT | Facility: CLINIC | Age: 62
End: 2022-06-11

## 2022-06-14 ENCOUNTER — TRANSFERRED RECORDS (OUTPATIENT)
Dept: HEALTH INFORMATION MANAGEMENT | Facility: CLINIC | Age: 62
End: 2022-06-14

## 2022-06-14 ENCOUNTER — MEDICAL CORRESPONDENCE (OUTPATIENT)
Dept: HEALTH INFORMATION MANAGEMENT | Facility: CLINIC | Age: 62
End: 2022-06-14

## 2022-06-17 ENCOUNTER — TRANSCRIBE ORDERS (OUTPATIENT)
Dept: OTHER | Age: 62
End: 2022-06-17

## 2022-06-17 DIAGNOSIS — L65.9 HAIR LOSS: Primary | ICD-10-CM

## 2022-10-22 ENCOUNTER — HEALTH MAINTENANCE LETTER (OUTPATIENT)
Age: 62
End: 2022-10-22

## 2022-11-25 NOTE — TELEPHONE ENCOUNTER
FUTURE VISIT INFORMATION      FUTURE VISIT INFORMATION:    Date: 2.16.23    Time: 3:15 PM    Location:   REFERRAL INFORMATION:    Referring provider:  Paemla Mosher    Referring providers clinic:  [facility]    Reason for visit/diagnosis  hair loss    RECORDS REQUESTED FROM:       Clinic name Comments Records Status   [facility] 6.14.22 Pamela Mosher Received  In Epic

## 2022-12-10 ENCOUNTER — HEALTH MAINTENANCE LETTER (OUTPATIENT)
Age: 62
End: 2022-12-10

## 2023-02-16 ENCOUNTER — LAB (OUTPATIENT)
Dept: LAB | Facility: CLINIC | Age: 63
End: 2023-02-16
Payer: COMMERCIAL

## 2023-02-16 ENCOUNTER — OFFICE VISIT (OUTPATIENT)
Dept: DERMATOLOGY | Facility: CLINIC | Age: 63
End: 2023-02-16
Payer: COMMERCIAL

## 2023-02-16 ENCOUNTER — PRE VISIT (OUTPATIENT)
Dept: DERMATOLOGY | Facility: CLINIC | Age: 63
End: 2023-02-16

## 2023-02-16 DIAGNOSIS — L65.9 NON-SCARRING ALOPECIA: Primary | ICD-10-CM

## 2023-02-16 DIAGNOSIS — L64.9 ANDROGENETIC ALOPECIA: ICD-10-CM

## 2023-02-16 DIAGNOSIS — L65.9 NON-SCARRING ALOPECIA: ICD-10-CM

## 2023-02-16 LAB
BASOPHILS # BLD AUTO: 0.1 10E3/UL (ref 0–0.2)
BASOPHILS NFR BLD AUTO: 1 %
EOSINOPHIL # BLD AUTO: 0.2 10E3/UL (ref 0–0.7)
EOSINOPHIL NFR BLD AUTO: 2 %
ERYTHROCYTE [DISTWIDTH] IN BLOOD BY AUTOMATED COUNT: 12.7 % (ref 10–15)
FERRITIN SERPL-MCNC: 203 NG/ML (ref 11–328)
HCT VFR BLD AUTO: 36.3 % (ref 35–47)
HGB BLD-MCNC: 12.1 G/DL (ref 11.7–15.7)
IMM GRANULOCYTES # BLD: 0 10E3/UL
IMM GRANULOCYTES NFR BLD: 0 %
IRON BINDING CAPACITY (ROCHE): 396 UG/DL (ref 240–430)
IRON SATN MFR SERPL: 14 % (ref 15–46)
IRON SERPL-MCNC: 56 UG/DL (ref 37–145)
LYMPHOCYTES # BLD AUTO: 3.3 10E3/UL (ref 0.8–5.3)
LYMPHOCYTES NFR BLD AUTO: 40 %
MCH RBC QN AUTO: 28.6 PG (ref 26.5–33)
MCHC RBC AUTO-ENTMCNC: 33.3 G/DL (ref 31.5–36.5)
MCV RBC AUTO: 86 FL (ref 78–100)
MONOCYTES # BLD AUTO: 0.6 10E3/UL (ref 0–1.3)
MONOCYTES NFR BLD AUTO: 8 %
NEUTROPHILS # BLD AUTO: 4 10E3/UL (ref 1.6–8.3)
NEUTROPHILS NFR BLD AUTO: 49 %
NRBC # BLD AUTO: 0 10E3/UL
NRBC BLD AUTO-RTO: 0 /100
PLATELET # BLD AUTO: 288 10E3/UL (ref 150–450)
RBC # BLD AUTO: 4.23 10E6/UL (ref 3.8–5.2)
TSH SERPL DL<=0.005 MIU/L-ACNC: 0.73 UIU/ML (ref 0.3–4.2)
WBC # BLD AUTO: 8.2 10E3/UL (ref 4–11)

## 2023-02-16 PROCEDURE — 82306 VITAMIN D 25 HYDROXY: CPT | Mod: 90 | Performed by: PATHOLOGY

## 2023-02-16 PROCEDURE — 85025 COMPLETE CBC W/AUTO DIFF WBC: CPT | Performed by: PATHOLOGY

## 2023-02-16 PROCEDURE — 84403 ASSAY OF TOTAL TESTOSTERONE: CPT | Mod: 90 | Performed by: PATHOLOGY

## 2023-02-16 PROCEDURE — 84270 ASSAY OF SEX HORMONE GLOBUL: CPT | Mod: 90 | Performed by: PATHOLOGY

## 2023-02-16 PROCEDURE — 83540 ASSAY OF IRON: CPT | Performed by: PATHOLOGY

## 2023-02-16 PROCEDURE — 84630 ASSAY OF ZINC: CPT | Mod: 90 | Performed by: PATHOLOGY

## 2023-02-16 PROCEDURE — 99000 SPECIMEN HANDLING OFFICE-LAB: CPT | Performed by: PATHOLOGY

## 2023-02-16 PROCEDURE — 82728 ASSAY OF FERRITIN: CPT | Mod: 90 | Performed by: PATHOLOGY

## 2023-02-16 PROCEDURE — 84443 ASSAY THYROID STIM HORMONE: CPT | Performed by: PATHOLOGY

## 2023-02-16 PROCEDURE — 99204 OFFICE O/P NEW MOD 45 MIN: CPT | Performed by: DERMATOLOGY

## 2023-02-16 PROCEDURE — 86376 MICROSOMAL ANTIBODY EACH: CPT | Mod: 90 | Performed by: PATHOLOGY

## 2023-02-16 PROCEDURE — 83550 IRON BINDING TEST: CPT | Performed by: PATHOLOGY

## 2023-02-16 PROCEDURE — 36415 COLL VENOUS BLD VENIPUNCTURE: CPT | Performed by: PATHOLOGY

## 2023-02-16 RX ORDER — FINASTERIDE 5 MG/1
5 TABLET, FILM COATED ORAL DAILY
Qty: 30 TABLET | Refills: 11 | Status: SHIPPED | OUTPATIENT
Start: 2023-02-16 | End: 2023-05-15

## 2023-02-16 RX ORDER — MINOXIDIL 2.5 MG/1
TABLET ORAL
Qty: 15 TABLET | Refills: 11 | Status: SHIPPED | OUTPATIENT
Start: 2023-02-16 | End: 2023-05-15

## 2023-02-16 ASSESSMENT — PAIN SCALES - GENERAL: PAINLEVEL: NO PAIN (0)

## 2023-02-16 NOTE — NURSING NOTE
Dermatology Rooming Note    Nadja Matias's goals for this visit include:   Chief Complaint   Patient presents with     Hair Loss     Nadja presents for      Jacqui Nguyen LPN

## 2023-02-16 NOTE — PATIENT INSTRUCTIONS
Start minoxidil 1.25 mg (1/2 tablet of 2.5 mg) once daily in the morning.   Start finasteride 5 mg (1 tablet) once daily in the morning.

## 2023-02-16 NOTE — LETTER
2/16/2023       RE: Nadja Matias  [address]  [facility] [address]     Dear Colleague,    Thank you for referring your patient, Nadja Matias, to the [facility] [place] at [facility]. Please see a copy of my visit note below.    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  Biopsies: previously biopsied, report not avilable  Prior tx: topical minoxidil. Labs: ordered today.  - Current tx: Start finasteride 5mg and oral minoxidil 1.25mg daily   ____________________________________________    Assessment & Plan:     # Non-scarring alopecia c/w androgenetic alopecia. Chronic, flare/not at goal.   Reviewed with patient that this appears to be androgenetic alopecia. Options include oral minoxidil and propecia. Reviewed side effects of oral Minoxidil are leg swelling, hypotension. Reviewed Finasteride has limited side effects for women. Recommend patient start both to maximize hair regrowth as they target different pathways. Will also get labs today.  - Labs ordered: CBC, Vit D, TSH, Thyroid peroxidase antibody, Iron studies (ferritin, iron), Testosterone (free and total), Zinc  - Start oral minoxidil 2.5mg: take one half tablet daily  - Start finasteride 5mg: take one tablet once daily     Procedures Performed:   None    Follow-up: 6 month(s) in-person, or earlier for new or changing lesions    Staff and Medical Student:     The patient was seen and staffed with Dr. Billy MD.    aDhiana Alonso, MS3     Staff Physician:  I was present with the medical student who participated in the service and in the documentation of the note. I have verified the history and personally performed the physical exam and medical decision making. I agree with the assessment and plan of care as documented in the note.  "    Howard Cervantes MD  Pronouns: he/him/his    Department of Dermatology  [facility]: Phone: [phone], Fax:[phone]  [facility]: Phone: [phone] Fax: [phone]  ____________________________________________    CC: Hair Loss (Nadja presents for HL)    HPI:    Ms. Nadja Matias is a 62 year old female who presents as a new patient for evaluation of hair loss.   - Onset of hair loss: ~2005  - Affected areas: top of the scalp, \"hat\" distribution  - Shedding or thinning, or both: both  - Percentage of hair loss: 40-50%  No Scalp pain   No Scalp burning   No Scalp itching    Slight Eyebrow changes    Slight Eyelash changes   Yes- shaves under chin/upper lip for 7-8 years Beard changes    No Other body hair changes    Yes - have been brittle in the past, has increased vitamin D and dairy  Nail changes    No  Additional symptoms? (please list below)     - Current treatments: None  - Prior discontinued treatments: Rogaine  - Prior biopsies: Yes   - Prior labs: None  - Scalp or hair care habits/products: Shea shampoo and conditioner   - - - Which products? How many times per week? Daily washes, no other products  - - - Frequency of hair sprays, gels, dyes, heat styling, perms, weaves or extensions?  Dyes hair every 5-6 weeks  - Menses: post-menopausal   - Unwanted hair growth/hirsutism: yes - hair around mouth and on chin, been there 7-8 years  - Recent weight loss: none  - Personal history of thyroid dysfunction or autoimmune disease: not that she knows of  - Family history of hair loss: sister and cousins had similar hair loss  - Family history of autoimmune disease: none    Patient is otherwise feeling well, in usual state of health, and has no additional skin concerns today.    ROS: As per HPI     Labs:  CBC , CMP  and BMP  from June 2022 reviewed.    Physical Exam:  Vitals: There " were no vitals taken for this visit.  GEN: Well developed, well-nourished, in no acute distress, in a pleasant mood.  SKIN: Focused examination of the scalp and face was performed.    - Casa part width of 4  - Diffuse hair loss on the caudal scalp (frontal scalp, mid scalp, and vertex affected) in an androgenic distribution  - Mild, diffuse erythema   - No perifollicular scale   - No scaling of the scalp   - Negative hair pull test   - Slightly decreased eyelash density  - Decreased eyebrow density  - Nail polish covering nails, unable to assess  - No scalp folliculitis/pustules   - No other lesions of concern on areas examined.     Medications:  Current Outpatient Medications   Medication     acetaminophen (TYLENOL) 325 MG tablet     albuterol (PROVENTIL,VENTOLIN) 90 MCG/ACT inhaler     atorvastatin (LIPITOR) 40 MG tablet     cetirizine (ZYRTEC) 10 MG tablet     famotidine (PEPCID) 20 MG tablet     finasteride (PROSCAR) 5 MG tablet     fluticasone (FLONASE) 50 MCG/ACT nasal spray     glucosamine sulfate 1000 MG CAPS     ibuprofen (ADVIL/MOTRIN) 200 MG tablet     metFORMIN (GLUCOPHAGE) 500 MG tablet     minoxidil (LONITEN) 2.5 MG tablet     Mometasone Furo-Formoterol Fum (DULERA IN)     montelukast (SINGULAIR) 10 MG tablet     multivitamin w/minerals (THERA-VIT-M) tablet     OLMESARTAN MEDOXOMIL PO     Vitamin D3 (CHOLECALCIFEROL) 25 mcg (1000 units) tablet     hydrOXYzine (ATARAX) 25 MG tablet     metFORMIN (GLUCOPHAGE) 500 MG tablet     olmesartan-hydrochlorothiazide (BENICAR HCT) 20-12.5 MG tablet     OMEPRAZOLE PO     SIMVASTATIN PO     No current facility-administered medications for this visit.      Past Medical History:   There is no problem list on file for this patient.    Past Medical History:   Diagnosis Date     Diabetes (H)      Gastroesophageal reflux disease      Hypertension      Migraine      Uncomplicated asthma        Again, thank you for allowing me to participate in the care of your patient.       Sincerely,    Howard Cervantes MD

## 2023-02-16 NOTE — LETTER
Date:February 17, 2023      Provider requested that no letter be sent. Do not send.       [facility]

## 2023-02-16 NOTE — PROGRESS NOTES
[facility] Dermatology Note  Encounter Date: Feb 16, 2023  Office Visit     Dermatology Problem List:  Non-scarring alopecia secondary to androgenetic alopecia.  Biopsies: previously biopsied, report not avilable  Prior tx: topical minoxidil. Labs: ordered today.  - Current tx: Start finasteride 5mg and oral minoxidil 1.25mg daily   ____________________________________________    Assessment & Plan:     # Non-scarring alopecia c/w androgenetic alopecia. Chronic, flare/not at goal.   Reviewed with patient that this appears to be androgenetic alopecia. Options include oral minoxidil and propecia. Reviewed side effects of oral Minoxidil are leg swelling, hypotension. Reviewed Finasteride has limited side effects for women. Recommend patient start both to maximize hair regrowth as they target different pathways. Will also get labs today.  - Labs ordered: CBC, Vit D, TSH, Thyroid peroxidase antibody, Iron studies (ferritin, iron), Testosterone (free and total), Zinc  - Start oral minoxidil 2.5mg: take one half tablet daily  - Start finasteride 5mg: take one tablet once daily     Procedures Performed:   None    Follow-up: 6 month(s) in-person, or earlier for new or changing lesions    Staff and Medical Student:     The patient was seen and staffed with Dr. Billy MD.    Dahiana Alonso, MS3     Staff Physician:  I was present with the medical student who participated in the service and in the documentation of the note. I have verified the history and personally performed the physical exam and medical decision making. I agree with the assessment and plan of care as documented in the note.     Howard Cervantes MD  Pronouns: he/him/his    Department of Dermatology  [facility]: Phone: [phone], Fax:[phone]  [facility]: Phone: [phone] Fax:  [phone]  ____________________________________________    CC: Hair Loss (Nadja presents for HL)    HPI:    Ms. Nadja Matias is a 62 year old female who presents as a new patient for evaluation of hair loss.   - Onset of hair loss: ~2005  - Affected areas: top of the scalp, \"hat\" distribution  - Shedding or thinning, or both: both  - Percentage of hair loss: 40-50%  No Scalp pain   No Scalp burning   No Scalp itching    Slight Eyebrow changes    Slight Eyelash changes   Yes- shaves under chin/upper lip for 7-8 years Beard changes    No Other body hair changes    Yes - have been brittle in the past, has increased vitamin D and dairy  Nail changes    No  Additional symptoms? (please list below)     - Current treatments: None  - Prior discontinued treatments: Rogaine  - Prior biopsies: Yes   - Prior labs: None  - Scalp or hair care habits/products: Shea shampoo and conditioner   - - - Which products? How many times per week? Daily washes, no other products  - - - Frequency of hair sprays, gels, dyes, heat styling, perms, weaves or extensions?  Dyes hair every 5-6 weeks  - Menses: post-menopausal   - Unwanted hair growth/hirsutism: yes - hair around mouth and on chin, been there 7-8 years  - Recent weight loss: none  - Personal history of thyroid dysfunction or autoimmune disease: not that she knows of  - Family history of hair loss: sister and cousins had similar hair loss  - Family history of autoimmune disease: none    Patient is otherwise feeling well, in usual state of health, and has no additional skin concerns today.    ROS: As per HPI     Labs:  CBC , CMP  and BMP  from June 2022 reviewed.    Physical Exam:  Vitals: There were no vitals taken for this visit.  GEN: Well developed, well-nourished, in no acute distress, in a pleasant mood.  SKIN: Focused examination of the scalp and face was performed.    - Casa part width of 4  - Diffuse hair loss on the caudal scalp (frontal scalp, mid scalp, and vertex " affected) in an androgenic distribution  - Mild, diffuse erythema   - No perifollicular scale   - No scaling of the scalp   - Negative hair pull test   - Slightly decreased eyelash density  - Decreased eyebrow density  - Nail polish covering nails, unable to assess  - No scalp folliculitis/pustules   - No other lesions of concern on areas examined.     Medications:  Current Outpatient Medications   Medication     acetaminophen (TYLENOL) 325 MG tablet     albuterol (PROVENTIL,VENTOLIN) 90 MCG/ACT inhaler     atorvastatin (LIPITOR) 40 MG tablet     cetirizine (ZYRTEC) 10 MG tablet     famotidine (PEPCID) 20 MG tablet     finasteride (PROSCAR) 5 MG tablet     fluticasone (FLONASE) 50 MCG/ACT nasal spray     glucosamine sulfate 1000 MG CAPS     ibuprofen (ADVIL/MOTRIN) 200 MG tablet     metFORMIN (GLUCOPHAGE) 500 MG tablet     minoxidil (LONITEN) 2.5 MG tablet     Mometasone Furo-Formoterol Fum (DULERA IN)     montelukast (SINGULAIR) 10 MG tablet     multivitamin w/minerals (THERA-VIT-M) tablet     OLMESARTAN MEDOXOMIL PO     Vitamin D3 (CHOLECALCIFEROL) 25 mcg (1000 units) tablet     hydrOXYzine (ATARAX) 25 MG tablet     metFORMIN (GLUCOPHAGE) 500 MG tablet     olmesartan-hydrochlorothiazide (BENICAR HCT) 20-12.5 MG tablet     OMEPRAZOLE PO     SIMVASTATIN PO     No current facility-administered medications for this visit.      Past Medical History:   There is no problem list on file for this patient.    Past Medical History:   Diagnosis Date     Diabetes (H)      Gastroesophageal reflux disease      Hypertension      Migraine      Uncomplicated asthma

## 2023-02-17 LAB
DEPRECATED CALCIDIOL+CALCIFEROL SERPL-MC: 76 UG/L (ref 20–75)
SHBG SERPL-SCNC: 15 NMOL/L (ref 30–135)
THYROPEROXIDASE AB SERPL-ACNC: <10 IU/ML

## 2023-02-18 LAB — ZINC SERPL-MCNC: 63 UG/DL

## 2023-02-20 LAB
TESTOST FREE SERPL-MCNC: 0.16 NG/DL
TESTOST SERPL-MCNC: 6 NG/DL (ref 8–60)

## 2023-04-01 ENCOUNTER — HEALTH MAINTENANCE LETTER (OUTPATIENT)
Age: 63
End: 2023-04-01

## 2023-05-10 ENCOUNTER — TELEPHONE (OUTPATIENT)
Dept: DERMATOLOGY | Facility: CLINIC | Age: 63
End: 2023-05-10
Payer: COMMERCIAL

## 2023-05-10 DIAGNOSIS — L64.9 ANDROGENETIC ALOPECIA: ICD-10-CM

## 2023-05-10 DIAGNOSIS — L65.9 NON-SCARRING ALOPECIA: ICD-10-CM

## 2023-05-10 NOTE — TELEPHONE ENCOUNTER
[facility] Call Center    Phone Message    May a detailed message be left on voicemail: yes     Reason for Call: Medication Question or concern regarding medication   Prescription Clarification  Name of Medication: minoxidil (LONITEN) 2.5 MG tablet & finasteride (PROSCAR) 5 MG tablet  Prescribing Provider: Dr. Cervantes   Pharmacy: [facility] P:[phone]   What on the order needs clarification? Pt states she is switching pharmacies to [facility] from [facility] and was told by [facility] that they were requesting new Rxs be sent to them.     Pt would like to know if the request has been received. Please call Pt back to discuss. Thank you.           Action Taken: Message routed to:  [facility] Derm    Travel Screening: Not Applicable

## 2023-05-11 NOTE — TELEPHONE ENCOUNTER
Writer called [facility] pharmacy and gave verbal orders for minoxidil and finasteride. Pharmacy acknowledged.    Writer then MyChart messaged patient to inform her that this has been dealt with.    Ashli GRAY RN

## 2023-05-15 RX ORDER — MINOXIDIL 2.5 MG/1
TABLET ORAL
Qty: 15 TABLET | Refills: 8 | Status: SHIPPED | OUTPATIENT
Start: 2023-05-15 | End: 2023-08-29

## 2023-05-15 RX ORDER — FINASTERIDE 5 MG/1
5 TABLET, FILM COATED ORAL DAILY
Qty: 30 TABLET | Refills: 8 | Status: SHIPPED | OUTPATIENT
Start: 2023-05-15 | End: 2023-08-29

## 2023-05-15 NOTE — TELEPHONE ENCOUNTER
Remaining refills sent to requested pharmacy.    Signed orders:  1. Finasteride: 2/16/2023 Disp:30 R:11  2. Minoxidil: 2/16/2023  Disp:45 R:11

## 2023-08-29 ENCOUNTER — OFFICE VISIT (OUTPATIENT)
Dept: DERMATOLOGY | Facility: CLINIC | Age: 63
End: 2023-08-29
Payer: COMMERCIAL

## 2023-08-29 VITALS — SYSTOLIC BLOOD PRESSURE: 133 MMHG | HEART RATE: 90 BPM | DIASTOLIC BLOOD PRESSURE: 87 MMHG

## 2023-08-29 DIAGNOSIS — L65.9 NON-SCARRING ALOPECIA: ICD-10-CM

## 2023-08-29 DIAGNOSIS — L21.9 DERMATITIS, SEBORRHEIC: Primary | ICD-10-CM

## 2023-08-29 DIAGNOSIS — L64.9 ANDROGENETIC ALOPECIA: ICD-10-CM

## 2023-08-29 PROCEDURE — 99214 OFFICE O/P EST MOD 30 MIN: CPT | Performed by: DERMATOLOGY

## 2023-08-29 RX ORDER — FINASTERIDE 5 MG/1
5 TABLET, FILM COATED ORAL DAILY
Qty: 90 TABLET | Refills: 3 | Status: SHIPPED | OUTPATIENT
Start: 2023-08-29 | End: 2024-02-23

## 2023-08-29 RX ORDER — MINOXIDIL 2.5 MG/1
2.5 TABLET ORAL DAILY
Qty: 90 TABLET | Refills: 3 | Status: SHIPPED | OUTPATIENT
Start: 2023-08-29 | End: 2024-02-23

## 2023-08-29 RX ORDER — FLUOCINOLONE ACETONIDE 0.1 MG/ML
SOLUTION TOPICAL
Qty: 90 ML | Refills: 11 | Status: SHIPPED | OUTPATIENT
Start: 2023-08-29

## 2023-08-29 ASSESSMENT — PAIN SCALES - GENERAL: PAINLEVEL: NO PAIN (0)

## 2023-08-29 NOTE — LETTER
"8/29/2023       RE: Nadja Matias  [address]  [place] [address]     Dear Colleague,    Thank you for referring your patient, Nadja Matias, to the [facility] [place] at [facility]. Please see a copy of my visit note below.    [facility] Dermatology Note  Encounter Date: Aug 29, 2023  Office Visit     Dermatology Problem List:  Non-scarring alopecia secondary to androgenetic alopecia.  Biopsies: previously biopsied, report not avilable  Prior tx: topical minoxidil. Labs: normal zinc, testosterone, Vitamin D, TSH, TPO antibodies, Iron studies.  - Current tx: finasteride 5mg and oral minoxidil 1.25mg daily   ____________________________________________    Assessment & Plan:     # Non-scarring alopecia c/w androgenetic alopecia. Chronic, flare/not at goal.   # Seborrheic dermatitis. Chronic, flare/not at goal.   Improved from prior, although with some scalp pruritus/scale. Will start topical steroid as below and increase dose of oral minoxidil.   - Increase oral minoxidil from 1.25 mg to 2.5 mg PO qdaily  - Continue finasteride 5mg: take one tablet once daily   - Start fluocinolone 0.01% solution at bedtime PRN    Procedures Performed: None    Follow-up: 6 month(s) in-person, or earlier for new or changing lesions    Staff     Howard Cervantes MD  Pronouns: he/him/his    Department of Dermatology  [facility]: Phone: [phone], Fax:[phone]  [facility]: Phone: [phone] Fax: [phone]  ____________________________________________    CC: Hair Loss (Hair loss follow-up:\"pretty good\" per patient. States that her hairdresser has noticed growth)    HPI:    Ms. Nadja Matias is a 63 year old female who presents as a return patient for hair loss.     Today, patient reports " hair-loss is improved from prior. She has been taking the oral minoxidil (1.25 mg) and finasteride 5 mg daily. Reports tolerating well, except for mild intermittent lower extremity edema. No lightheaded/dizziness or hirsutism. She states her hairdress has noticed some regrowth on her scalp which was encouraging.     ROS: As per HPI     Labs:  CBC , CMP  and BMP  from June 2022 reviewed.    Physical Exam:  Vitals: /87 (BP Location: Right arm, Patient Position: Sitting, Cuff Size: Adult Regular)   Pulse 90   GEN: Well developed, well-nourished, in no acute distress, in a pleasant mood.  SKIN: Focused examination of the scalp and face was performed.    - Decreased hair density over the vertex/crown of scalp in female pattern distribution with some fine fiber regrowth; improved from prior.   - No other lesions of concern on areas examined.     Medications:  Current Outpatient Medications   Medication    acetaminophen (TYLENOL) 325 MG tablet    albuterol (PROVENTIL,VENTOLIN) 90 MCG/ACT inhaler    atorvastatin (LIPITOR) 40 MG tablet    cetirizine (ZYRTEC) 10 MG tablet    famotidine (PEPCID) 20 MG tablet    finasteride (PROSCAR) 5 MG tablet    fluticasone (FLONASE) 50 MCG/ACT nasal spray    glucosamine sulfate 1000 MG CAPS    ibuprofen (ADVIL/MOTRIN) 200 MG tablet    metFORMIN (GLUCOPHAGE) 500 MG tablet    minoxidil (LONITEN) 2.5 MG tablet    Mometasone Furo-Formoterol Fum (DULERA IN)    montelukast (SINGULAIR) 10 MG tablet    multivitamin w/minerals (THERA-VIT-M) tablet    OLMESARTAN MEDOXOMIL PO    Vitamin D3 (CHOLECALCIFEROL) 25 mcg (1000 units) tablet    hydrOXYzine (ATARAX) 25 MG tablet    metFORMIN (GLUCOPHAGE) 500 MG tablet    olmesartan-hydrochlorothiazide (BENICAR HCT) 20-12.5 MG tablet    OMEPRAZOLE PO    SIMVASTATIN PO     No current facility-administered medications for this visit.      Past Medical History:   There is no problem list on file for this patient.    Past Medical History:   Diagnosis Date     Diabetes (H)     Gastroesophageal reflux disease     Hypertension     Migraine     Uncomplicated asthma

## 2023-08-29 NOTE — NURSING NOTE
"Dermatology Rooming Note    Nadja Matias's goals for this visit include:   Chief Complaint   Patient presents with    Hair Loss     Hair loss follow-up:\"pretty good\" per patient. States that her hairdresser has noticed growth     Jacqui Nguyen LPN    "

## 2023-08-29 NOTE — PROGRESS NOTES
"[facility] Dermatology Note  Encounter Date: Aug 29, 2023  Office Visit     Dermatology Problem List:  Non-scarring alopecia secondary to androgenetic alopecia.  Biopsies: previously biopsied, report not avilable  Prior tx: topical minoxidil. Labs: normal zinc, testosterone, Vitamin D, TSH, TPO antibodies, Iron studies.  - Current tx: finasteride 5mg and oral minoxidil 1.25mg daily   ____________________________________________    Assessment & Plan:     # Non-scarring alopecia c/w androgenetic alopecia. Chronic, flare/not at goal.   # Seborrheic dermatitis. Chronic, flare/not at goal.   Improved from prior, although with some scalp pruritus/scale. Will start topical steroid as below and increase dose of oral minoxidil.   - Increase oral minoxidil from 1.25 mg to 2.5 mg PO qdaily  - Continue finasteride 5mg: take one tablet once daily   - Start fluocinolone 0.01% solution at bedtime PRN    Procedures Performed: None    Follow-up: 6 month(s) in-person, or earlier for new or changing lesions    Staff     Howard Cervantes MD  Pronouns: he/him/his    Department of Dermatology  [facility]: Phone: [phone], Fax:[phone]  [facility]: Phone: [phone] Fax: [phone]  ____________________________________________    CC: Hair Loss (Hair loss follow-up:\"pretty good\" per patient. States that her hairdresser has noticed growth)    HPI:    Ms. Nadja Matias is a 63 year old female who presents as a return patient for hair loss.     Today, patient reports hair-loss is improved from prior. She has been taking the oral minoxidil (1.25 mg) and finasteride 5 mg daily. Reports tolerating well, except for mild intermittent lower extremity edema. No lightheaded/dizziness or hirsutism. She states her hairdress has noticed some regrowth on her scalp which was encouraging.     ROS: As " per HPI     Labs:  CBC , CMP  and BMP  from June 2022 reviewed.    Physical Exam:  Vitals: /87 (BP Location: Right arm, Patient Position: Sitting, Cuff Size: Adult Regular)   Pulse 90   GEN: Well developed, well-nourished, in no acute distress, in a pleasant mood.  SKIN: Focused examination of the scalp and face was performed.    - Decreased hair density over the vertex/crown of scalp in female pattern distribution with some fine fiber regrowth; improved from prior.   - No other lesions of concern on areas examined.     Medications:  Current Outpatient Medications   Medication    acetaminophen (TYLENOL) 325 MG tablet    albuterol (PROVENTIL,VENTOLIN) 90 MCG/ACT inhaler    atorvastatin (LIPITOR) 40 MG tablet    cetirizine (ZYRTEC) 10 MG tablet    famotidine (PEPCID) 20 MG tablet    finasteride (PROSCAR) 5 MG tablet    fluticasone (FLONASE) 50 MCG/ACT nasal spray    glucosamine sulfate 1000 MG CAPS    ibuprofen (ADVIL/MOTRIN) 200 MG tablet    metFORMIN (GLUCOPHAGE) 500 MG tablet    minoxidil (LONITEN) 2.5 MG tablet    Mometasone Furo-Formoterol Fum (DULERA IN)    montelukast (SINGULAIR) 10 MG tablet    multivitamin w/minerals (THERA-VIT-M) tablet    OLMESARTAN MEDOXOMIL PO    Vitamin D3 (CHOLECALCIFEROL) 25 mcg (1000 units) tablet    hydrOXYzine (ATARAX) 25 MG tablet    metFORMIN (GLUCOPHAGE) 500 MG tablet    olmesartan-hydrochlorothiazide (BENICAR HCT) 20-12.5 MG tablet    OMEPRAZOLE PO    SIMVASTATIN PO     No current facility-administered medications for this visit.      Past Medical History:   There is no problem list on file for this patient.    Past Medical History:   Diagnosis Date    Diabetes (H)     Gastroesophageal reflux disease     Hypertension     Migraine     Uncomplicated asthma

## 2024-01-19 ENCOUNTER — TRANSFERRED RECORDS (OUTPATIENT)
Dept: HEALTH INFORMATION MANAGEMENT | Facility: CLINIC | Age: 64
End: 2024-01-19

## 2024-01-23 ENCOUNTER — TRANSFERRED RECORDS (OUTPATIENT)
Dept: HEALTH INFORMATION MANAGEMENT | Facility: CLINIC | Age: 64
End: 2024-01-23

## 2024-02-12 ENCOUNTER — TELEPHONE (OUTPATIENT)
Dept: DERMATOLOGY | Facility: CLINIC | Age: 64
End: 2024-02-12
Payer: COMMERCIAL

## 2024-02-12 NOTE — TELEPHONE ENCOUNTER
I left  for patient asking if she would be willing to move her appointment with Dr. Ruiz on 2/23/24 from 2 pm to 3 pm due to overbooking.

## 2024-02-23 ENCOUNTER — OFFICE VISIT (OUTPATIENT)
Dept: DERMATOLOGY | Facility: CLINIC | Age: 64
End: 2024-02-23
Payer: COMMERCIAL

## 2024-02-23 VITALS — DIASTOLIC BLOOD PRESSURE: 84 MMHG | SYSTOLIC BLOOD PRESSURE: 124 MMHG | HEART RATE: 69 BPM | OXYGEN SATURATION: 97 %

## 2024-02-23 DIAGNOSIS — L64.9 ANDROGENETIC ALOPECIA: ICD-10-CM

## 2024-02-23 PROBLEM — G89.29 CHRONIC BILATERAL LOW BACK PAIN WITHOUT SCIATICA: Status: ACTIVE | Noted: 2023-07-21

## 2024-02-23 PROBLEM — M99.09 SEGMENTAL AND SOMATIC DYSFUNCTION: Status: ACTIVE | Noted: 2023-02-15

## 2024-02-23 PROBLEM — M54.50 CHRONIC BILATERAL LOW BACK PAIN WITHOUT SCIATICA: Status: ACTIVE | Noted: 2023-07-21

## 2024-02-23 PROBLEM — R73.9 ELEVATED BLOOD SUGAR: Status: ACTIVE | Noted: 2017-02-06

## 2024-02-23 PROBLEM — K52.9 COLITIS: Status: ACTIVE | Noted: 2018-07-22

## 2024-02-23 PROBLEM — J45.30 MILD PERSISTENT ASTHMA WITHOUT COMPLICATION: Status: ACTIVE | Noted: 2017-08-08

## 2024-02-23 PROBLEM — Z86.0100 HISTORY OF COLONIC POLYPS: Status: ACTIVE | Noted: 2018-07-20

## 2024-02-23 PROBLEM — R19.7 DIARRHEA: Status: ACTIVE | Noted: 2018-07-20

## 2024-02-23 PROBLEM — G43.909 MIGRAINE SYNDROME: Status: ACTIVE | Noted: 2017-02-06

## 2024-02-23 PROBLEM — K57.90 DIVERTICULAR DISEASE: Status: ACTIVE | Noted: 2023-09-15

## 2024-02-23 PROBLEM — Z00.00 ANNUAL PHYSICAL EXAM: Status: ACTIVE | Noted: 2017-08-08

## 2024-02-23 PROBLEM — K63.5 POLYP OF COLON: Status: ACTIVE | Noted: 2023-09-15

## 2024-02-23 PROBLEM — M85.80 OSTEOPENIA: Status: ACTIVE | Noted: 2017-08-08

## 2024-02-23 PROBLEM — E11.9 TYPE 2 DIABETES MELLITUS WITHOUT COMPLICATION, WITHOUT LONG-TERM CURRENT USE OF INSULIN (H): Status: ACTIVE | Noted: 2023-07-28

## 2024-02-23 PROBLEM — M54.2 NECK PAIN: Chronic | Status: ACTIVE | Noted: 2023-01-27

## 2024-02-23 PROCEDURE — 99214 OFFICE O/P EST MOD 30 MIN: CPT | Performed by: DERMATOLOGY

## 2024-02-23 RX ORDER — FLUOCINOLONE ACETONIDE 0.11 MG/ML
OIL TOPICAL
Qty: 118.28 ML | Refills: 1 | Status: SHIPPED | OUTPATIENT
Start: 2024-02-23

## 2024-02-23 RX ORDER — AMLODIPINE BESYLATE 10 MG/1
10 TABLET ORAL DAILY
COMMUNITY

## 2024-02-23 RX ORDER — MINOXIDIL 2.5 MG/1
2.5 TABLET ORAL DAILY
Qty: 90 TABLET | Refills: 3 | Status: SHIPPED | OUTPATIENT
Start: 2024-02-23

## 2024-02-23 RX ORDER — FINASTERIDE 5 MG/1
5 TABLET, FILM COATED ORAL DAILY
Qty: 90 TABLET | Refills: 3 | Status: SHIPPED | OUTPATIENT
Start: 2024-02-23 | End: 2024-02-23

## 2024-02-23 ASSESSMENT — PAIN SCALES - GENERAL: PAINLEVEL: NO PAIN (0)

## 2024-02-23 NOTE — LETTER
2/23/2024         RE: Nadja Matias  [address]  [place] [address]        Dear Colleague,    Thank you for referring your patient, Nadja Matias, to the [facility]. Please see a copy of my visit note below.      [facility] Dermatology Note  Encounter Date: Feb 23, 2024  Office Visit     Dermatology Problem List:  Non-scarring alopecia secondary to androgenetic alopecia.  Biopsies: previously biopsied, report not avilable  Prior tx: topical minoxidil. Labs: normal zinc, testosterone, Vitamin D, TSH, TPO antibodies, Iron studies.  - Current tx: finasteride 5mg and oral minoxidil 2.5mg daily, fluocinolone 0.01% solution nightly  2. Seborrheic dermatitis    ____________________________________________    Assessment & Plan:    # Non-scarring alopecia c/w androgenetic alopecia. Chronic, improved. Has had a few incidences of dizziness but BP normal, will review with PCP. /84 today.   # Seborrheic dermatitis. At goal   - Fluocinolone 0.01% solution once weekly before bed. Has on hand.  - Continue oral minoxidil 2.5 mg PO qdaily. Refilled today. Denies leg swelling, sob, chest pain, reviewed black box warning, reports cards aware on this drug, will get records  - stop finasteride for 2 weeks. Dizziness started far after starting this.  Call if you want this back in 2 weeks. No mood issues she reports.   - BP is 124/84 today.  - Consider LLLT HairMax comb TIW.    # Hypertrichosis, minoxidil induced.   - Not currently too bothered by issue.   -monitor      #recommend we get a copy of cardiology records.      Procedures Performed:   none    Follow-up: 9 months in person    Staff and Scribe:     Scribe Disclosure:   I, Bhakti White, am serving as a scribe to document services personally performed by Bianka Ruiz MD based on data collection and the provider's statements to me.       Provider Disclosure:   The documentation recorded by the scribe accurately  reflects the services I personally performed and the decisions made by me.    Bianka Ruiz MD    Department of Dermatology  [facility]: Phone: [phone], Fax:[phone]  [facility]: Phone: [phone], Fax: [phone]   ____________________________________________    CC: Hair Loss (Return hairloss appointment. Not sure where she is where with growth. She felt like it was coming in better in the beginning but unsure how it is coming in now. Is getting a lot of facial hair on sideburns and cheeks.)    HPI:  Ms. Nadja Matias is a(n) 63 year old female who presents today as a return patient for hair loss.    Last seen by Dr Howard Cervantes 8/29/23 for non-scarring alopecia c/w androgenetic alopecia and seborrheic dermatitis. Added fluocinolone 0.01% solution nightly. Increase minoxidil from 1.25 mg to 2.5 mg.     Today, patient reports she is growing quite a bit of facial hair on the jaw line. She says her hairdresser has said she is growing more hair. She is still taking the finasteride and the minoxidil. She has had a few episodes of dizziness once a day. Cardiology is aware she is taking minoxidil she repots and is okay with this  .       Patient is otherwise feeling well, without additional skin concerns.    Labs Reviewed:  N/A    Physical Exam:  Vitals: /84   Pulse 69   SpO2 97%   SKIN: Focused examination of scalp and face was performed.  - No erythema on the scalp  - Casa part is 3.  - Hair regrowth on the crown.  - Vellus hairs on the bilateral jaw line.  - Terminal hairs on chin.  -no erythema  - No other lesions of concern on areas examined.     Medications:  Current Outpatient Medications   Medication     albuterol (PROVENTIL,VENTOLIN) 90 MCG/ACT inhaler     amLODIPine (NORVASC) 10 MG tablet     atorvastatin (LIPITOR) 40 MG tablet     famotidine (PEPCID) 20 MG tablet      finasteride (PROSCAR) 5 MG tablet     fluticasone (FLONASE) 50 MCG/ACT nasal spray     glucosamine sulfate 1000 MG CAPS     ibuprofen (ADVIL/MOTRIN) 200 MG tablet     metFORMIN (GLUCOPHAGE) 500 MG tablet     minoxidil (LONITEN) 2.5 MG tablet     montelukast (SINGULAIR) 10 MG tablet     multivitamin w/minerals (THERA-VIT-M) tablet     acetaminophen (TYLENOL) 325 MG tablet     cetirizine (ZYRTEC) 10 MG tablet     fluocinolone (SYNALAR) 0.01 % solution     hydrOXYzine (ATARAX) 25 MG tablet     metFORMIN (GLUCOPHAGE) 500 MG tablet     Mometasone Furo-Formoterol Fum (DULERA IN)     OLMESARTAN MEDOXOMIL PO     olmesartan-hydrochlorothiazide (BENICAR HCT) 20-12.5 MG tablet     OMEPRAZOLE PO     SIMVASTATIN PO     Vitamin D3 (CHOLECALCIFEROL) 25 mcg (1000 units) tablet     No current facility-administered medications for this visit.      Past Medical History:   There is no problem list on file for this patient.    Past Medical History:   Diagnosis Date     Diabetes (H)      Gastroesophageal reflux disease      Hypertension      Migraine      Uncomplicated asthma         CC No referring provider defined for this encounter. on close of this encounter.      Again, thank you for allowing me to participate in the care of your patient.        Sincerely,        Bianka Ruiz MD

## 2024-02-23 NOTE — PROGRESS NOTES
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  Biopsies: previously biopsied, report not avilable  Prior tx: topical minoxidil. Labs: normal zinc, testosterone, Vitamin D, TSH, TPO antibodies, Iron studies.  - Current tx: finasteride 5mg and oral minoxidil 2.5mg daily, fluocinolone 0.01% solution nightly  2. Seborrheic dermatitis    ____________________________________________    Assessment & Plan:    # Non-scarring alopecia c/w androgenetic alopecia. Chronic, improved. Has had a few incidences of dizziness but BP normal, will review with PCP. /84 today.   # Seborrheic dermatitis. At goal   - Fluocinolone 0.01% solution once weekly before bed. Has on hand.  - Continue oral minoxidil 2.5 mg PO qdaily. Refilled today. Denies leg swelling, sob, chest pain, reviewed black box warning, reports cards aware on this drug, will get records  - stop finasteride for 2 weeks. Dizziness started far after starting this.  Call if you want this back in 2 weeks. No mood issues she reports.   - BP is 124/84 today.  - Consider LLLT HairMax comb TIW.    # Hypertrichosis, minoxidil induced.   - Not currently too bothered by issue.   -monitor      #recommend we get a copy of cardiology records.      Procedures Performed:   none    Follow-up: 9 months in person    Staff and Scribe:     Scribe Disclosure:   I, Bhakti White, am serving as a scribe to document services personally performed by Bianka Ruiz MD based on data collection and the provider's statements to me.       Provider Disclosure:   The documentation recorded by the scribe accurately reflects the services I personally performed and the decisions made by me.    Bianka Ruiz MD    Department of Dermatology  [facility]: Phone: [phone],  Fax:[phone]  [facility]: Phone: [phone], Fax: [phone]   ____________________________________________    CC: Hair Loss (Return hairloss appointment. Not sure where she is where with growth. She felt like it was coming in better in the beginning but unsure how it is coming in now. Is getting a lot of facial hair on sideburns and cheeks.)    HPI:  Ms. Nadja Matias is a(n) 63 year old female who presents today as a return patient for hair loss.    Last seen by Dr Howard Cervantes 8/29/23 for non-scarring alopecia c/w androgenetic alopecia and seborrheic dermatitis. Added fluocinolone 0.01% solution nightly. Increase minoxidil from 1.25 mg to 2.5 mg.     Today, patient reports she is growing quite a bit of facial hair on the jaw line. She says her hairdresser has said she is growing more hair. She is still taking the finasteride and the minoxidil. She has had a few episodes of dizziness once a day. Cardiology is aware she is taking minoxidil she repots and is okay with this  .       Patient is otherwise feeling well, without additional skin concerns.    Labs Reviewed:  N/A    Physical Exam:  Vitals: /84   Pulse 69   SpO2 97%   SKIN: Focused examination of scalp and face was performed.  - No erythema on the scalp  - Casa part is 3.  - Hair regrowth on the crown.  - Vellus hairs on the bilateral jaw line.  - Terminal hairs on chin.  -no erythema  - No other lesions of concern on areas examined.     Medications:  Current Outpatient Medications   Medication    albuterol (PROVENTIL,VENTOLIN) 90 MCG/ACT inhaler    amLODIPine (NORVASC) 10 MG tablet    atorvastatin (LIPITOR) 40 MG tablet    famotidine (PEPCID) 20 MG tablet    finasteride (PROSCAR) 5 MG tablet    fluticasone (FLONASE) 50 MCG/ACT nasal spray    glucosamine sulfate 1000 MG CAPS    ibuprofen (ADVIL/MOTRIN) 200 MG tablet    metFORMIN (GLUCOPHAGE) 500 MG tablet    minoxidil (LONITEN) 2.5 MG  tablet    montelukast (SINGULAIR) 10 MG tablet    multivitamin w/minerals (THERA-VIT-M) tablet    acetaminophen (TYLENOL) 325 MG tablet    cetirizine (ZYRTEC) 10 MG tablet    fluocinolone (SYNALAR) 0.01 % solution    hydrOXYzine (ATARAX) 25 MG tablet    metFORMIN (GLUCOPHAGE) 500 MG tablet    Mometasone Furo-Formoterol Fum (DULERA IN)    OLMESARTAN MEDOXOMIL PO    olmesartan-hydrochlorothiazide (BENICAR HCT) 20-12.5 MG tablet    OMEPRAZOLE PO    SIMVASTATIN PO    Vitamin D3 (CHOLECALCIFEROL) 25 mcg (1000 units) tablet     No current facility-administered medications for this visit.      Past Medical History:   There is no problem list on file for this patient.    Past Medical History:   Diagnosis Date    Diabetes (H)     Gastroesophageal reflux disease     Hypertension     Migraine     Uncomplicated asthma         CC No referring provider defined for this encounter. on close of this encounter.

## 2024-02-23 NOTE — NURSING NOTE
January 16, 2023     Patient: Arelis Munson   YOB: 1971   Date of Visit: 1/16/2023       To Whom It May Concern:    It is my medical opinion that Arelis Munson may return to work on 1/19/23.           Sincerely,        LORENZO Matthews    CC: No Recipients   Nadja Matias's goals for this visit include:   Chief Complaint   Patient presents with    Hair Loss     Return hairloss appointment. Not sure where she is where with growth. She felt like it was coming in better in the beginning but unsure how it is coming in now. Is getting a lot of facial hair on sideburns and cheeks.       She requests these members of her care team be copied on today's visit information:     PCP: Carina Harrison    Referring Provider:  No referring provider defined for this encounter.    There were no vitals taken for this visit.    Do you need any medication refills at today's visit? Yes        Jojo Ayon LPN on 2/23/2024 at 3:21 PM

## 2024-02-23 NOTE — PATIENT INSTRUCTIONS
Consider hair max laser comb    Stop the finasteride for 2 weeks and see if the dizziness resolves. If it does not then go to the primary care doctor     Check blood pressure during episodes of dizziness      The use of minoxidil by  mouth to grow hair is not FDA approved.   Description and Brand Names ([facility])       Drug information provided by: [employer]    US Brand Name  Mica Fernandez    Minoxidil belongs to the general class of medicines called antihypertensives. It is used to treat high blood pressure (hypertension).    High blood pressure adds to the workload of the heart and arteries. If it continues for a long time, the heart and arteries may not function properly. This can damage the blood vessels of the brain, heart, and kidneys, resulting in a stroke, heart failure, or kidney failure. High blood pressure may also increase the risk of heart attacks. These problems may be less likely to occur if blood pressure is controlled.    Minoxidil works by relaxing blood vessels so that blood passes through them more easily. This helps to lower blood pressure.    Minoxidil has other effects that could be bothersome for some patients. These include increased hair growth, weight gain, fast heartbeat, and chest pain. Before you take this medicine, be sure that you have discussed the use of it with your doctor.    Minoxidil is being applied to the scalp in liquid form by some balding men to stimulate hair growth. However, improper use of liquids made from minoxidil tablets can result in minoxidil being absorbed into the body, where it may cause unwanted effects on the heart and blood vessels.    Minoxidil is available only with your doctor's prescription.    This product is available in the following dosage forms:    Tablet    Before Using  Drug information provided by: [employer]    In deciding to use a medicine, the risks of taking the medicine must be weighed against the good it will do. This is  a decision you and your doctor will make. For this medicine, the following should be considered:    Allergies  Tell your doctor if you have ever had any unusual or allergic reaction to this medicine or any other medicines. Also tell your health care professional if you have any other types of allergies, such as to foods, dyes, preservatives, or animals. For non-prescription products, read the label or package ingredients carefully.    Pediatric  Although there is no specific information comparing use of minoxidil in children with use in other age groups, this medicine is not expected to cause different side effects or problems in children than it does in adults.    Geriatric  Elderly patients may be more sensitive to the effects of minoxidil. In addition, minoxidil may reduce tolerance to cold temperatures in elderly patients.    Breastfeeding  Studies in women suggest that this medication poses minimal risk to the infant when used during breastfeeding.    Drug Interactions  Although certain medicines should not be used together at all, in other cases two different medicines may be used together even if an interaction might occur. In these cases, your doctor may want to change the dose, or other precautions may be necessary. Tell your healthcare professional if you are taking any other prescription or nonprescription (over-the-counter [OTC]) medicine.    Other Interactions  Certain medicines should not be used at or around the time of eating food or eating certain types of food since interactions may occur. Using alcohol or tobacco with certain medicines may also cause interactions to occur. Discuss with your healthcare professional the use of your medicine with food, alcohol, or tobacco.    Other Medical Problems  The presence of other medical problems may affect the use of this medicine. Make sure you tell your doctor if you have any other medical problems, especially:    Angina (chest pain)--Minoxidil may make  this condition worse  Heart attack or stroke (recent)--Lowering blood pressure may make problems resulting from heart attack or stroke worse  Heart or blood vessel disease--Minoxidil can cause fluid buildup, which can cause problems  Kidney disease--Effects may be increased because of slower removal of minoxidil from the body  Pheochromocytoma--Minoxidil may cause the tumor to be more active    Storage  Store the medicine in a closed container at room temperature, away from heat, moisture, and direct light. Keep from freezing.    Keep out of the reach of children.    Do not keep outdated medicine or medicine no longer needed.    Precautions  Drug information provided by: [employer]    It is important that your doctor check your progress at regular visits to make sure that this medicine is working properly.    Ask your doctor about checking your pulse rate before and after taking minoxidil. Then, while you are taking this medicine, check your pulse regularly while you are resting. If it increases by 20 beats or more a minute, check with your doctor right away.    While you are taking minoxidil, weigh yourself every day. A weight gain of 2 to 3 pounds (about 1 kg) in an adult is normal and should be lost with continued treatment. However, if you suddenly gain 5 pounds (2 kg) or more (for a child, 2 pounds [1 kg] or more) or if you notice swelling of your feet or lower legs, check with your doctor right away.    Do not take other medicines unless they have been discussed with your doctor. This especially includes over-the-counter (nonprescription) medicines for appetite control, asthma, colds, cough, hay fever, or sinus problems, since they may tend to increase your blood pressure.    Side Effects  Drug information provided by: [employer]    Along with its needed effects, a medicine may cause some unwanted effects. Although not all of these side effects may occur, if they do occur they may need medical  attention.    Check with your doctor immediately if any of the following side effects occur:    More common  Fast or irregular heartbeat  weight gain (rapid) of more than 5 pounds (2 pounds in children)  Less common  Chest pain  shortness of breath  Check with your doctor as soon as possible if any of the following side effects occur:    More common  Bloating  flushing or redness of skin  swelling of feet or lower legs  Less common  Numbness or tingling of hands, feet, or face  Rare  Skin rash and itching  Some side effects may occur that usually do not need medical attention. These side effects may go away during treatment as your body adjusts to the medicine. Also, your health care professional may be able to tell you about ways to prevent or reduce some of these side effects. Check with your health care professional if any of the following side effects continue or are bothersome or if you have any questions about them:    More common  Increase in hair growth, usually on face, arms, and back  Less common or rare  Breast tenderness in males and females  headache  This medicine causes a temporary increase in hair growth in most people. Hair may grow longer and darker in both men and women. This may first be noticed on the face several weeks after you start taking minoxidil. Later, new hair growth may be noticed on the back, arms, legs, and scalp. Talk to your doctor about shaving or using a hair remover during this time. After treatment with minoxidil has ended, the hair will stop growing, although it may take several months for the new hair growth to go away.    Other side effects not listed may also occur in some patients. If you notice any other effects, check with your healthcare professional.    Call your doctor for medical advice about side effects. You may report side effects to the FDA at 1-770-FDA-4487.

## 2024-03-24 ENCOUNTER — HEALTH MAINTENANCE LETTER (OUTPATIENT)
Age: 64
End: 2024-03-24

## 2024-06-02 ENCOUNTER — HEALTH MAINTENANCE LETTER (OUTPATIENT)
Age: 64
End: 2024-06-02

## 2024-08-11 ENCOUNTER — HEALTH MAINTENANCE LETTER (OUTPATIENT)
Age: 64
End: 2024-08-11

## 2024-10-25 DIAGNOSIS — L65.9 NON-SCARRING ALOPECIA: Primary | ICD-10-CM

## 2024-10-25 RX ORDER — FINASTERIDE 5 MG/1
5 TABLET, FILM COATED ORAL DAILY
Qty: 90 TABLET | Refills: 1 | Status: SHIPPED | OUTPATIENT
Start: 2024-10-25

## 2024-10-25 NOTE — TELEPHONE ENCOUNTER
Pt wants to restart finasteride. Reports she got the OK from Dr. Mckinney with [facility] in [place] to restart. See notes from 2/23/24 office visit. Routing to Dr. Ruiz for approval.    # Non-scarring alopecia c/w androgenetic alopecia. Chronic, improved. Has had a few incidences of dizziness but BP normal, will review with PCP. /84 today.   # Seborrheic dermatitis. At goal   - Fluocinolone 0.01% solution once weekly before bed. Has on hand.  - Continue oral minoxidil 2.5 mg PO qdaily. Refilled today. Denies leg swelling, sob, chest pain, reviewed black box warning, reports cards aware on this drug, will get records  - stop finasteride for 2 weeks. Dizziness started far after starting this.  Call if you want this back in 2 weeks. No mood issues she reports.

## 2024-10-25 NOTE — TELEPHONE ENCOUNTER
[facility] Call Center    Phone Message    May a detailed message be left on voicemail: yes     Reason for Call: Other: Pt was wondering if she can reschedule her appt with Dr. Ruiz and also has questions about refills. Please call pt to discuss. Thank you     Action Taken: TE SENT    Travel Screening: Not Applicable     Date of Service:             Thank you!  [facility]

## 2024-12-22 ENCOUNTER — HEALTH MAINTENANCE LETTER (OUTPATIENT)
Age: 64
End: 2024-12-22

## 2025-03-23 ENCOUNTER — HEALTH MAINTENANCE LETTER (OUTPATIENT)
Age: 65
End: 2025-03-23

## 2025-06-15 ENCOUNTER — HEALTH MAINTENANCE LETTER (OUTPATIENT)
Age: 65
End: 2025-06-15

## 2025-07-06 ENCOUNTER — HEALTH MAINTENANCE LETTER (OUTPATIENT)
Age: 65
End: 2025-07-06

## (undated) DEVICE — ENDO POUCH UNIV RETRIEVAL SYSTEM INZII 10MM CD001

## (undated) DEVICE — Device

## (undated) DEVICE — ESU HOLDER LAP INST DISP PURPLE LONG 330MM H-PRO-330

## (undated) DEVICE — SUCTION CANISTER MEDIVAC LINER 3000ML W/LID 65651-530

## (undated) DEVICE — SU MONOCRYL 4-0 PS-2 18" UND Y496G

## (undated) DEVICE — NDL INSUFFLATION 13GA 120MM C2201

## (undated) DEVICE — SOL NACL 0.9% IRRIG 1000ML BOTTLE 2F7124

## (undated) DEVICE — PREP DURAPREP 26ML APL 8630

## (undated) DEVICE — WIPES FOLEY CARE SURESTEP PROVON DFC100

## (undated) DEVICE — ENDO SCOPE WARMER LF TM500

## (undated) DEVICE — ENDO TROCAR FIRST ENTRY KII FIOS Z-THRD 11X100MM CTF33

## (undated) DEVICE — SOL NACL 0.9% INJ 1000ML BAG 2B1324X

## (undated) DEVICE — ENDO TROCAR SLEEVE KII Z-THREADED 05X100MM CTS02

## (undated) DEVICE — SOL ADH LIQUID BENZOIN SWAB 0.6ML C1544

## (undated) DEVICE — ESU LIGASURE LAPAROSCOPIC BLUNT TIP SEALER 5MMX37CM LF1837

## (undated) DEVICE — LINEN TOWEL PACK X5 5464

## (undated) DEVICE — SOL WATER IRRIG 1000ML BOTTLE 2F7114

## (undated) DEVICE — ESU GROUND PAD UNIVERSAL W/O CORD

## (undated) DEVICE — CATH TRAY FOLEY SURESTEP 16FR W/URINE MTR STATLK LF A303416A

## (undated) DEVICE — ENDO TROCAR FIRST ENTRY KII FIOS Z-THRD 05X100MM CTF03

## (undated) DEVICE — GLOVE PROTEXIS W/NEU-THERA 6.5  2D73TE65

## (undated) RX ORDER — OXYCODONE HYDROCHLORIDE 5 MG/1
TABLET ORAL
Status: DISPENSED
Start: 2021-08-18

## (undated) RX ORDER — KETOROLAC TROMETHAMINE 30 MG/ML
INJECTION, SOLUTION INTRAMUSCULAR; INTRAVENOUS
Status: DISPENSED
Start: 2021-08-18

## (undated) RX ORDER — ONDANSETRON 2 MG/ML
INJECTION INTRAMUSCULAR; INTRAVENOUS
Status: DISPENSED
Start: 2021-08-18

## (undated) RX ORDER — PROPOFOL 10 MG/ML
INJECTION, EMULSION INTRAVENOUS
Status: DISPENSED
Start: 2021-08-18

## (undated) RX ORDER — LIDOCAINE HYDROCHLORIDE 20 MG/ML
INJECTION, SOLUTION EPIDURAL; INFILTRATION; INTRACAUDAL; PERINEURAL
Status: DISPENSED
Start: 2021-08-18

## (undated) RX ORDER — ACETAMINOPHEN 325 MG/1
TABLET ORAL
Status: DISPENSED
Start: 2021-08-18

## (undated) RX ORDER — HYDROMORPHONE HYDROCHLORIDE 1 MG/ML
INJECTION, SOLUTION INTRAMUSCULAR; INTRAVENOUS; SUBCUTANEOUS
Status: DISPENSED
Start: 2021-08-18

## (undated) RX ORDER — DEXAMETHASONE SODIUM PHOSPHATE 4 MG/ML
INJECTION, SOLUTION INTRA-ARTICULAR; INTRALESIONAL; INTRAMUSCULAR; INTRAVENOUS; SOFT TISSUE
Status: DISPENSED
Start: 2021-08-18

## (undated) RX ORDER — FENTANYL CITRATE 50 UG/ML
INJECTION, SOLUTION INTRAMUSCULAR; INTRAVENOUS
Status: DISPENSED
Start: 2021-08-18

## (undated) RX ORDER — HYDROMORPHONE HCL IN WATER/PF 6 MG/30 ML
PATIENT CONTROLLED ANALGESIA SYRINGE INTRAVENOUS
Status: DISPENSED
Start: 2021-08-18

## (undated) RX ORDER — FENTANYL CITRATE 0.05 MG/ML
INJECTION, SOLUTION INTRAMUSCULAR; INTRAVENOUS
Status: DISPENSED
Start: 2021-08-18